# Patient Record
Sex: MALE | Race: BLACK OR AFRICAN AMERICAN | NOT HISPANIC OR LATINO | Employment: OTHER | URBAN - METROPOLITAN AREA
[De-identification: names, ages, dates, MRNs, and addresses within clinical notes are randomized per-mention and may not be internally consistent; named-entity substitution may affect disease eponyms.]

---

## 2024-09-23 ENCOUNTER — OFFICE VISIT (OUTPATIENT)
Dept: PRIMARY CARE CLINIC | Facility: CLINIC | Age: 64
End: 2024-09-23
Payer: MEDICARE

## 2024-09-23 VITALS
OXYGEN SATURATION: 98 % | HEIGHT: 69 IN | BODY MASS INDEX: 33.89 KG/M2 | WEIGHT: 228.81 LBS | SYSTOLIC BLOOD PRESSURE: 133 MMHG | TEMPERATURE: 98 F | DIASTOLIC BLOOD PRESSURE: 93 MMHG | HEART RATE: 76 BPM

## 2024-09-23 DIAGNOSIS — M70.21 OLECRANON BURSITIS OF RIGHT ELBOW: ICD-10-CM

## 2024-09-23 DIAGNOSIS — I10 ESSENTIAL HYPERTENSION: Primary | ICD-10-CM

## 2024-09-23 PROCEDURE — 99204 OFFICE O/P NEW MOD 45 MIN: CPT | Mod: S$GLB,,, | Performed by: STUDENT IN AN ORGANIZED HEALTH CARE EDUCATION/TRAINING PROGRAM

## 2024-09-23 PROCEDURE — 1159F MED LIST DOCD IN RCRD: CPT | Mod: CPTII,S$GLB,, | Performed by: STUDENT IN AN ORGANIZED HEALTH CARE EDUCATION/TRAINING PROGRAM

## 2024-09-23 PROCEDURE — 3075F SYST BP GE 130 - 139MM HG: CPT | Mod: CPTII,S$GLB,, | Performed by: STUDENT IN AN ORGANIZED HEALTH CARE EDUCATION/TRAINING PROGRAM

## 2024-09-23 PROCEDURE — 1101F PT FALLS ASSESS-DOCD LE1/YR: CPT | Mod: CPTII,S$GLB,, | Performed by: STUDENT IN AN ORGANIZED HEALTH CARE EDUCATION/TRAINING PROGRAM

## 2024-09-23 PROCEDURE — 3078F DIAST BP <80 MM HG: CPT | Mod: CPTII,S$GLB,, | Performed by: STUDENT IN AN ORGANIZED HEALTH CARE EDUCATION/TRAINING PROGRAM

## 2024-09-23 PROCEDURE — 3008F BODY MASS INDEX DOCD: CPT | Mod: CPTII,S$GLB,, | Performed by: STUDENT IN AN ORGANIZED HEALTH CARE EDUCATION/TRAINING PROGRAM

## 2024-09-23 PROCEDURE — 99999 PR PBB SHADOW E&M-NEW PATIENT-LVL III: CPT | Mod: PBBFAC,,, | Performed by: STUDENT IN AN ORGANIZED HEALTH CARE EDUCATION/TRAINING PROGRAM

## 2024-09-23 PROCEDURE — 3288F FALL RISK ASSESSMENT DOCD: CPT | Mod: CPTII,S$GLB,, | Performed by: STUDENT IN AN ORGANIZED HEALTH CARE EDUCATION/TRAINING PROGRAM

## 2024-09-23 RX ORDER — AMLODIPINE BESYLATE 10 MG/1
10 TABLET ORAL
COMMUNITY

## 2024-09-23 RX ORDER — TAMSULOSIN HYDROCHLORIDE 0.4 MG/1
1 CAPSULE ORAL DAILY
COMMUNITY
Start: 2024-07-11

## 2024-09-23 RX ORDER — ATORVASTATIN CALCIUM 40 MG/1
40 TABLET, FILM COATED ORAL
COMMUNITY

## 2024-09-23 NOTE — PROGRESS NOTES
Subjective     Patient ID: David Woods Jr. is a 64 y.o. male.    Chief Complaint: Elbow Injury (Pt fell backwards from truck and landed on R elbow 09/18/2024. Did not realize his elbow was injured/hurting until days after fall. Pain 3/10. Has irritation when sleeping. Did not go to ED. Meds: took tylenol 2 times since the incident. )    David Wodos is a 64 year old male with a history of HTN, HLD, and BPH presenting with a ongoing pain in the R elbow following an injury that occurred on 9/18.    The patient was getting into his truck, and lost his footing due to slippery mud and fell backwards striking his buttocks and R elbow. He felt normal at the time and denies dizziness, presyncope/syncope, vision changes, confusion, and weakness prior to falling. He did not initially notice elbow discomfort/swelling and noted that he only had pain in his sacral area which has now resolved. A few days after the fall, family members and friends noticed that his R elbow was enlarged and this was when the patient initially noticed the injury; however, he had been having very mild pain in the R elbow. Since then, he has not noticed progressive swelling and states that it has remained relatively stable in size since first noticing the injury. Today, he says that the pain is 3/10 and only bothers him when he is moving around in bed. Otherwise, he denies tenderness, redness, warmth, fever, and chills. He took OTC acetaminophen twice for pain and this helped. He is not currently taking blood thinners or anticoagulants.        Elbow Injury  Associated symptoms include joint swelling. Pertinent negatives include no arthralgias, change in bowel habit, chest pain, chills, coughing, fatigue, fever, headaches, myalgias, nausea, numbness, rash, vertigo, vomiting or weakness.     Review of Systems   Constitutional:  Negative for activity change, chills, fatigue and fever.   HENT:  Negative for dental problem, ear discharge, hearing loss,  rhinorrhea, sinus pressure/congestion, tinnitus and trouble swallowing.    Eyes:  Negative for photophobia, pain and visual disturbance.   Respiratory:  Negative for cough and shortness of breath.    Cardiovascular:  Negative for chest pain, palpitations and claudication.   Gastrointestinal:  Negative for blood in stool, change in bowel habit, nausea and vomiting.   Genitourinary:  Negative for bladder incontinence, difficulty urinating, dysuria, flank pain, hematuria and testicular pain.   Musculoskeletal:  Positive for joint swelling and joint deformity. Negative for arthralgias and myalgias.   Integumentary:  Negative for color change, rash and wound.   Neurological:  Negative for dizziness, vertigo, syncope, weakness, light-headedness, numbness, headaches, memory loss and coordination difficulties.   Hematological:  Negative for adenopathy. Does not bruise/bleed easily.   Psychiatric/Behavioral:  Negative for confusion.        Outpatient Medications as of 9/23/2024   Medication Sig Dispense Refill    tamsulosin (FLOMAX) 0.4 mg Cap Take 1 capsule by mouth once daily.      amLODIPine (NORVASC) 10 MG tablet Take 10 mg by mouth.      atorvastatin (LIPITOR) 40 MG tablet Take 40 mg by mouth.       No current facility-administered medications on file as of 9/23/2024.         Objective     Vitals:    09/23/24 1515   BP: (!) 133/93   Pulse:    Temp:       Physical Exam  Constitutional:       Appearance: Normal appearance. He is not ill-appearing, toxic-appearing or diaphoretic.   HENT:      Head: Normocephalic and atraumatic.      Right Ear: External ear normal.      Left Ear: External ear normal.      Nose: Nose normal. No rhinorrhea.      Mouth/Throat:      Mouth: Mucous membranes are moist.   Eyes:      Extraocular Movements: Extraocular movements intact.      Conjunctiva/sclera: Conjunctivae normal.      Pupils: Pupils are equal, round, and reactive to light.   Neck:      Vascular: No carotid bruit.   Cardiovascular:       Rate and Rhythm: Normal rate and regular rhythm.      Pulses: Normal pulses.      Heart sounds: Normal heart sounds. No murmur heard.  Pulmonary:      Effort: Pulmonary effort is normal.      Breath sounds: Normal breath sounds. No wheezing or rhonchi.   Musculoskeletal:         General: Swelling and deformity present. No tenderness. Normal range of motion.      Cervical back: Normal range of motion.      Right lower leg: No edema.      Left lower leg: No edema.      Comments: Large, round fluid-filled subcutaneous mass over the R olecranon. No redness, tenderness, or changes to the overlying skin.   Skin:     General: Skin is warm.      Capillary Refill: Capillary refill takes less than 2 seconds.      Findings: No bruising, erythema or lesion.   Neurological:      General: No focal deficit present.      Mental Status: He is alert and oriented to person, place, and time.      Motor: No weakness.   Psychiatric:         Mood and Affect: Mood normal.         Behavior: Behavior normal.      The assessment and plan was discussed with Dr. Craven.     Assessment and Plan   Olecranon Bursitis (R elbow)  Use compression bandages to keep consistent compression over the area.  Follow up in 2 weeks to assess progress and to consider drainage if the swelling persists.    2.   Hypertension  Diastolic BP was slightly elevated during today's visit (/93).  He is currently taking amlodipine 10 mg QD.  Reassess BP during follow-up visit in 2 weeks.        Follow up in 2 weeks to reassess BP and progress of R elbow bursitis    Eldon Duran, MS4    I hereby acknowledge that I am relying upon documentation authored by a medical student working under my supervision and further I hereby attest that I have verified the student documentation or findings by personally performing the physical exam and medical decision making activities of the Evaluation and Management service to be billed.    Paulina Craven MD

## 2024-09-23 NOTE — PATIENT INSTRUCTIONS
Please check your blood pressure once daily and log the results     Follow up with your PCP in roughly 2 weeks if blood pressure still >140/90 on average.    Follow up with your PCP in roughly 2 weeks for right elbow swelling. Make sure to wear your compression sleeve most of the day!

## 2024-09-24 PROBLEM — N40.0 BENIGN PROSTATIC HYPERPLASIA: Status: ACTIVE | Noted: 2022-06-01

## 2024-09-24 PROBLEM — E78.5 HYPERLIPIDEMIA: Status: ACTIVE | Noted: 2022-06-01

## 2024-09-24 PROBLEM — I10 ESSENTIAL HYPERTENSION: Status: ACTIVE | Noted: 2022-06-01

## 2024-09-24 PROBLEM — M17.12 PRIMARY OSTEOARTHRITIS OF LEFT KNEE: Status: ACTIVE | Noted: 2023-08-10

## 2024-12-09 ENCOUNTER — OFFICE VISIT (OUTPATIENT)
Dept: PRIMARY CARE CLINIC | Facility: CLINIC | Age: 64
End: 2024-12-09
Payer: MEDICARE

## 2024-12-09 VITALS
BODY MASS INDEX: 33.21 KG/M2 | HEART RATE: 70 BPM | SYSTOLIC BLOOD PRESSURE: 144 MMHG | DIASTOLIC BLOOD PRESSURE: 80 MMHG | OXYGEN SATURATION: 100 % | RESPIRATION RATE: 16 BRPM | WEIGHT: 224.88 LBS

## 2024-12-09 DIAGNOSIS — J00 ACUTE RHINITIS: Primary | ICD-10-CM

## 2024-12-09 DIAGNOSIS — I10 ESSENTIAL HYPERTENSION: ICD-10-CM

## 2024-12-09 PROCEDURE — 1160F RVW MEDS BY RX/DR IN RCRD: CPT | Mod: CPTII,S$GLB,,

## 2024-12-09 PROCEDURE — 1159F MED LIST DOCD IN RCRD: CPT | Mod: CPTII,S$GLB,,

## 2024-12-09 PROCEDURE — 3077F SYST BP >= 140 MM HG: CPT | Mod: CPTII,S$GLB,,

## 2024-12-09 PROCEDURE — 3079F DIAST BP 80-89 MM HG: CPT | Mod: CPTII,S$GLB,,

## 2024-12-09 PROCEDURE — 99214 OFFICE O/P EST MOD 30 MIN: CPT | Mod: S$GLB,,,

## 2024-12-09 PROCEDURE — 99999 PR PBB SHADOW E&M-EST. PATIENT-LVL III: CPT | Mod: PBBFAC,,,

## 2024-12-09 PROCEDURE — 3008F BODY MASS INDEX DOCD: CPT | Mod: CPTII,S$GLB,,

## 2024-12-09 RX ORDER — CETIRIZINE HYDROCHLORIDE 10 MG/1
10 TABLET ORAL NIGHTLY
Qty: 30 TABLET | Refills: 0 | Status: SHIPPED | OUTPATIENT
Start: 2024-12-09

## 2024-12-09 RX ORDER — FLUTICASONE PROPIONATE 50 MCG
1 SPRAY, SUSPENSION (ML) NASAL 2 TIMES DAILY
Qty: 16 G | Refills: 0 | Status: SHIPPED | OUTPATIENT
Start: 2024-12-09

## 2024-12-09 NOTE — PROGRESS NOTES
Subjective     Patient ID: David Woods Jr. is a 64 y.o. male.    Chief Complaint: ALISON Woods Jr. is a 64 year old male, presents to clinic for general medical exam.  New to me.   PCP is Dr. Sorto.  Medical and surgical history, in addition to problem list reviewed and listed below.    Complain of cold in chest and sinus acting up.    Hypertension - Elevated. States took medication right prior to arrival here.  Been taking over the counter cold preparations.    URI   This is a new problem. Episode onset: Last Sunday, a week ago. The problem has been waxing and waning. There has been no fever. The cough is Dry. Associated symptoms include congestion, headaches (slight), rhinorrhea and sneezing. Pertinent negatives include no abdominal pain, chest pain, diarrhea, dysuria, ear pain, joint pain, joint swelling, nausea, neck pain, plugged ear sensation, rash, sinus pain, vomiting or wheezing. Sore throat: had irritation but left.Associated symptoms comments: Runny eyes, itchy ears. He has tried acetaminophen (Vicks, Hanna seltzer plus, NyQuil) for the symptoms. The treatment provided no relief.     Review of Systems   Constitutional:  Negative for fever and night sweats.   HENT:  Positive for nasal congestion, postnasal drip, rhinorrhea and sneezing. Negative for ear pain and sinus pressure/congestion. Sore throat: had irritation but left.   Respiratory:  Negative for chest tightness, shortness of breath and wheezing.    Cardiovascular:  Negative for chest pain and palpitations.   Gastrointestinal:  Negative for abdominal pain, diarrhea, nausea and vomiting.   Genitourinary:  Negative for dysuria.   Musculoskeletal:  Negative for gait problem, joint pain and neck pain.   Integumentary:  Negative for rash.   Neurological:  Positive for headaches (slight). Negative for tremors, seizures, syncope and speech difficulty.     History reviewed. No pertinent past medical history.  History reviewed. No pertinent  surgical history.  Social History     Socioeconomic History    Marital status:    Tobacco Use    Smoking status: Former     Types: Cigarettes    Smokeless tobacco: Never   Substance and Sexual Activity    Alcohol use: Never    Drug use: Never     Review of patient's allergies indicates:  No Known Allergies    Patient Active Problem List   Diagnosis    Hyperlipidemia    Essential hypertension    Primary osteoarthritis of left knee    Benign prostatic hyperplasia        Objective   Vitals:    12/09/24 1445 12/09/24 1446 12/09/24 1535   BP: (!) 185/84 (!) 172/84 (!) 144/80  Comment: manual   BP Location: Right arm  Right arm   Patient Position: Lying  Sitting   Pulse: 70     Resp: 16     SpO2: 100%     Weight: 102 kg (224 lb 13.9 oz)         Physical Exam  Constitutional:       General: He is not in acute distress.     Appearance: Normal appearance.   HENT:      Head: Normocephalic and atraumatic.      Right Ear: Tympanic membrane, ear canal and external ear normal.      Left Ear: Tympanic membrane, ear canal and external ear normal.      Nose: Mucosal edema, congestion and rhinorrhea present. Rhinorrhea is clear.      Right Sinus: No maxillary sinus tenderness or frontal sinus tenderness.      Left Sinus: No maxillary sinus tenderness or frontal sinus tenderness.      Mouth/Throat:      Mouth: Mucous membranes are moist.      Pharynx: Oropharynx is clear. No oropharyngeal exudate or posterior oropharyngeal erythema.   Eyes:      Extraocular Movements: Extraocular movements intact.      Conjunctiva/sclera: Conjunctivae normal.      Pupils: Pupils are equal, round, and reactive to light.   Cardiovascular:      Rate and Rhythm: Normal rate and regular rhythm.      Pulses: Normal pulses.      Heart sounds: Normal heart sounds.   Pulmonary:      Effort: Pulmonary effort is normal. No respiratory distress.      Breath sounds: Normal breath sounds. No wheezing.   Abdominal:      General: Bowel sounds are normal.       Palpations: Abdomen is soft.   Musculoskeletal:         General: Normal range of motion.      Cervical back: Normal range of motion and neck supple.      Right lower leg: No edema.      Left lower leg: No edema.   Skin:     General: Skin is warm and dry.      Capillary Refill: Capillary refill takes less than 2 seconds.      Findings: No rash.   Neurological:      Mental Status: He is alert and oriented to person, place, and time.   Psychiatric:         Mood and Affect: Mood normal.         Behavior: Behavior normal.          Assessment and Plan     1. Acute rhinitis  -     fluticasone propionate (FLONASE) 50 mcg/actuation nasal spray; 1 spray (50 mcg total) by Each Nostril route 2 (two) times a day.  Dispense: 16 g; Refill: 0  -     cetirizine (ZYRTEC) 10 MG tablet; Take 1 tablet (10 mg total) by mouth every evening.  Dispense: 30 tablet; Refill: 0        -     Irrigate nose with normal saline prior to bathing/showering (steamy environment) and use before fluticasone.  Stay hydrated: drink water, tea, broth soup and non-caffeinated sports drinks. Gargle with warm saltwater (half teaspoon salt with full glass of water) to help soothe throat.  Wash your hands frequently.    2. Essential hypertension        -     Continue current medical regimen.      Current Outpatient Medications on File Prior to Visit   Medication Sig Dispense Refill    amLODIPine (NORVASC) 10 MG tablet Take 10 mg by mouth.      atorvastatin (LIPITOR) 40 MG tablet Take 40 mg by mouth.      tamsulosin (FLOMAX) 0.4 mg Cap Take 1 capsule by mouth once daily.       No current facility-administered medications on file prior to visit.          Follow up if symptoms worsen or fail to improve.    Lori A. Stephens Sandifer, CAROL

## 2025-01-30 DIAGNOSIS — J00 ACUTE RHINITIS: ICD-10-CM

## 2025-02-02 RX ORDER — FLUTICASONE PROPIONATE 50 MCG
1 SPRAY, SUSPENSION (ML) NASAL 2 TIMES DAILY
Qty: 16 G | Refills: 0 | Status: SHIPPED | OUTPATIENT
Start: 2025-02-02